# Patient Record
Sex: FEMALE | Race: AMERICAN INDIAN OR ALASKA NATIVE | URBAN - METROPOLITAN AREA
[De-identification: names, ages, dates, MRNs, and addresses within clinical notes are randomized per-mention and may not be internally consistent; named-entity substitution may affect disease eponyms.]

---

## 2018-03-04 LAB
ALT SERPL-CCNC: 14 IU/L (ref 6–31)
AST SERPL-CCNC: 14 IU/L (ref 10–40)
CREAT SERPL-MCNC: 0.9 MG/DL (ref 0.4–1)
GLUCOSE SERPL-MCNC: 97 MG/DL (ref 70–100)
POTASSIUM SERPL-SCNC: 4.1 MEQ/L (ref 3.4–5.1)

## 2018-03-05 ENCOUNTER — TRANSFERRED RECORDS (OUTPATIENT)
Dept: HEALTH INFORMATION MANAGEMENT | Facility: CLINIC | Age: 43
End: 2018-03-05

## 2018-03-05 ENCOUNTER — HOSPITAL ENCOUNTER (INPATIENT)
Facility: HOSPITAL | Age: 43
LOS: 4 days | Discharge: HOME | DRG: 881 | End: 2018-03-09
Attending: PSYCHIATRY & NEUROLOGY | Admitting: PSYCHIATRY & NEUROLOGY

## 2018-03-05 DIAGNOSIS — F13.10 BENZODIAZEPINE ABUSE (H): ICD-10-CM

## 2018-03-05 DIAGNOSIS — T42.6X2S: Primary | ICD-10-CM

## 2018-03-05 PROBLEM — T14.91XA SUICIDE ATTEMPT (H): Status: ACTIVE | Noted: 2018-03-05

## 2018-03-05 PROCEDURE — 12400000 ZZH R&B MH

## 2018-03-05 RX ORDER — ALUMINA, MAGNESIA, AND SIMETHICONE 2400; 2400; 240 MG/30ML; MG/30ML; MG/30ML
30 SUSPENSION ORAL EVERY 4 HOURS PRN
Status: DISCONTINUED | OUTPATIENT
Start: 2018-03-05 | End: 2018-03-09 | Stop reason: HOSPADM

## 2018-03-05 RX ORDER — OLANZAPINE 10 MG/2ML
10 INJECTION, POWDER, FOR SOLUTION INTRAMUSCULAR 3 TIMES DAILY PRN
Status: DISCONTINUED | OUTPATIENT
Start: 2018-03-05 | End: 2018-03-09 | Stop reason: HOSPADM

## 2018-03-05 RX ORDER — ACETAMINOPHEN 325 MG/1
650 TABLET ORAL EVERY 4 HOURS PRN
Status: DISCONTINUED | OUTPATIENT
Start: 2018-03-05 | End: 2018-03-09 | Stop reason: HOSPADM

## 2018-03-05 RX ORDER — HYDROXYZINE HYDROCHLORIDE 25 MG/1
25-50 TABLET, FILM COATED ORAL EVERY 4 HOURS PRN
Status: DISCONTINUED | OUTPATIENT
Start: 2018-03-05 | End: 2018-03-09 | Stop reason: HOSPADM

## 2018-03-05 RX ORDER — HYDROXYCHLOROQUINE SULFATE 200 MG/1
200 TABLET, FILM COATED ORAL 2 TIMES DAILY
COMMUNITY

## 2018-03-05 RX ORDER — OXYCODONE AND ACETAMINOPHEN 5; 325 MG/1; MG/1
1-2 TABLET ORAL DAILY PRN
Status: ON HOLD | COMMUNITY
End: 2018-03-08

## 2018-03-05 RX ORDER — OLANZAPINE 10 MG/1
10 TABLET ORAL 3 TIMES DAILY PRN
Status: DISCONTINUED | OUTPATIENT
Start: 2018-03-05 | End: 2018-03-09 | Stop reason: HOSPADM

## 2018-03-05 NOTE — IP AVS SNAPSHOT
[facility]    [address] [place] [address]    Phone:  [phone]    Fax:  [phone]                                       After Visit Summary   3/5/2018    Claudia Beltran    MRN: [id number]           After Visit Summary Signature Page     I have received my discharge instructions, and my questions have been answered. I have discussed any challenges I see with this plan with the nurse or doctor.    ..........................................................................................................................................  Patient/Patient Representative Signature      ..........................................................................................................................................  Patient Representative Print Name and Relationship to Patient    ..................................................               ................................................  Date                                            Time    ..........................................................................................................................................  Reviewed by Signature/Title    ...................................................              ..............................................  Date                                                            Time

## 2018-03-05 NOTE — IP AVS SNAPSHOT
MRN:[id number]                      After Visit Summary   3/5/2018    Claudia Beltran    MRN: [id number]           Thank you!     Thank you for choosing [facility] for your care. Our goal is always to provide you with excellent care. Hearing back from our patients is one way we can continue to improve our services. Please take a few minutes to complete the written survey that you may receive in the mail after you visit with us. Thank you!        Patient Information     Date Of Birth          1975        Designated Caregiver       Most Recent Value    Caregiver    Will someone help with your care after discharge? no    Name of designated caregiver lives with mother      About your hospital stay     You were admitted on:  March 5, 2018 You last received care in the:  [facility]    You were discharged on:  March 9, 2018       Who to Call     For medical emergencies, please call 911.  For non-urgent questions about your medical care, please call your primary care provider or clinic, None          Attending Provider     Provider Specialty    Pedro Butts MD Psychiatry    Allison Hearn NP Nurse Practitioner       Primary Care Provider Fax #    Physician No Ref-Primary [phone]      Further instructions from your care team       Behavioral Discharge Planning and Instructions    Summary: Patient was admitted after an intentional overdoes.     Main Diagnosis:  Unspecified depressive disorder, Rule out major depressive disorder, severe without psychotic features, Opiate use disorder, severe    Major Treatments, Procedures and Findings: Stabilize with medications, connect with community programs.    Symptoms to Report: feeling more aggressive, increased confusion, losing more sleep, mood getting worse or thoughts of suicide    Lifestyle Adjustment: Take all medications as prescribed, meet with doctor/ medication provider, out patient therapist, , and [facility] worker as  scheduled. Abstain from alcohol or any unprescribed drugs.    Psychiatry Follow-up:     [facility]   PCP- Dr. Loreta Cruz- March 27th @ 1:00pm   [address]  [address]  [place] [address]  Phone: [phone] Fax: [phone]     [facility]   Rule 25 assessment- Дмитрий Wood- March 14th @ 10am, Please ask for referral for mental health services at this apt   Phone:[phone] Fax: [phone]  [facility] Counselor- Anne Manzo- [phone]  Mental Health Supervisor- Bobbi Dale [phone]- Call for mental health services: Medication Management, Therapy, Case Management    Family Violence Prevention   Phone: [phone]  Crisis Line- 9-806-619-4004     Meeting: Mondays, 7 p.m., [facility]    Mobile Crisis Team/ Crisis Hotline: Call 1-718.571.1532 to reach [facility] Crisis Response.    Resources:   Crisis Intervention: 470.242.1002 or 236-854-8059 (TTY: 141.834.7078).  Call anytime for help.  National [place] on Mental Illness (www.[place].amie.org): 276.505.9751 or 252-677-5547.  Suicide Awareness Voices of Education (SAVE) (www.save.org): 272-382-HXBE (9878)  National Suicide Prevention Line (www.mentalhealthmn.org): 464-774-TNNZ (1643)  Mental Health Consumer/Survivor Network of [place] (www.mhcsn.net): 289.909.3004 or 625-762-1282  Mental Health Association of [place] (www.mentalhealth.org): 974.613.3353 or 095-863-5429    General Medication Instructions:   See your medication sheet(s) for instructions.   Take all medicines as directed.  Make no changes unless your doctor suggests them.   Go to all your doctor visits.  Be sure to have all your required lab tests. This way, your medicines can be refilled on time.  Do not use any drugs not prescribed by your doctor.  Avoid alcohol.    [place] Area:  [facility], Crisis stabilization [facility] [phone]  [facility] Crisis Line: 1-427.536.5113  [facility]ates For Family Peace: 578-9327  Sexual Assault Program [facility]  "[facility]: 490.439.7772 or 1-703.296.2631  [place] [facility] Women[facility]: 1-706.495.6108 Ext: 279       Calls answered Mon-Fri-8:00 am--4:30 pm    [place]:  [facility] for Family Peace: 1-221.624.3125  [facility] first call for help: 5-407-013-7728  [facility]:  (370) 350-4851      [place] Area:  Warm Line: 1-676.408.4957       Calls answered Tuesday--Saturday 4:00 pm--10:00 pm  [facility] Crisis Line - 740.563.9559  [facility] 418-508-5409    [place] Statewide:  [place] Crisis and Referral Services: 1-590.403.3468  National Suicide Prevention Lifeline: 8-908-572-TALK (9040)   - ond6thjs- Text  Life  to [address]5312  First Call for Help:   [facility] Helpline- 6-739-AGCS-HELP      Pending Results     No orders found from 3/3/2018 to 3/6/2018.            Statement of Approval     Ordered          18 1437  I have reviewed and agree with all the recommendations and orders detailed in this document.  EFFECTIVE NOW     Approved and electronically signed by:  Rani Garcia Mc, DAVID CNP             Admission Information     Date & Time Department Dept. Phone    3/5/2018 [facility] [phone]      Your Vitals Were     Blood Pressure Pulse Temperature Respirations Weight Pulse Oximetry    96/62 61 96.2  F (35.7  C) (Tympanic) 14 134 kg (295 lb 5 oz) 100%      Weekend-a-gogohart Information     WebVisible lets you send messages to your doctor, view your test results, renew your prescriptions, schedule appointments and more. To sign up, go to [email or web address] . Click on \"Log in\" on the left side of the screen, which will take you to the Welcome page. Then click on \"Sign up Now\" on the right side of the page.     You will be asked to enter the access code listed below, as well as some personal information. Please follow the directions to create your username and password.     Your access code is: [email or web address]  Expires: 2018 12:10 AM     Your access code will  in 90 " days. If you need help or a new code, please call your [facility] clinic or [phone].        Care EveryWhere ID     This is your Care EveryWhere ID. This could be used by other organizations to access your [facility] medical records  [id number]        Equal Access to Services     BARBIESHANTHI PAPO : Hadii aad ku hadckdontrell Sokash, waaxda luqadaha, qaybta kaalmada sosanellyzane, pao ruizgracekary guerrero. So [facility] [phone].    ATENCIÓN: Si habla español, tiene a mario disposición servicios gratuitos de asistencia lingüística. Llame al [phone].    We comply with applicable federal civil rights laws and [place] laws. We do not discriminate on the basis of race, color, national origin, age, disability, sex, sexual orientation, or gender identity.               Review of your medicines      CONTINUE these medicines which may have CHANGED, or have new prescriptions. If we are uncertain of the size of tablets/capsules you have at home, strength may be listed as something that might have changed.        Dose / Directions    DULoxetine 30 MG EC capsule   Commonly known as:  CYMBALTA   This may have changed:    - medication strength  - how much to take   Used for:  Gabapentin overdose, intentional self-harm, sequela (H), Benzodiazepine abuse        Dose:  90 mg   Take 3 capsules (90 mg) by mouth daily   Quantity:  90 capsule   Refills:  0         CONTINUE these medicines which have NOT CHANGED        Dose / Directions    GABAPENTIN PO        Dose:  300 mg   Take 300 mg by mouth 3 times daily   Refills:  0       hydroxychloroquine 200 MG tablet   Commonly known as:  PLAQUENIL        Dose:  200 mg   Take 200 mg by mouth 2 times daily   Refills:  0       TOPIRAMATE PO   Indication:  not filled since 9/2017        Dose:  50 mg   Take 50 mg by mouth 2 times daily Not filled at pharmacy   Refills:  0       TRAZODONE HCL PO        Dose:  50 mg   Take 50 mg by mouth At Bedtime   Refills:  0         STOP taking      oxyCODONE-acetaminophen 5-325 MG per tablet   Commonly known as:  PERCOCET                Where to get your medicines      These medications were sent to [facility] - [place], [place] - [address]  [address] [place] [address]     Phone:  [phone]     DULoxetine 30 MG EC capsule                Protect others around you: Learn how to safely use, store and throw away your medicines at www.disposemymeds.org.        ANTIBIOTIC INSTRUCTION     You've Been Prescribed an Antibiotic - Now What?  Your healthcare team thinks that you or your loved one might have an infection. Some infections can be treated with antibiotics, which are powerful, life-saving drugs. Like all medications, antibiotics have side effects and should only be used when necessary. There are some important things you should know about your antibiotic treatment.      Your healthcare team may run tests before you start taking an antibiotic.    Your team may take samples (e.g., from your blood, urine or other areas) to run tests to look for bacteria. These test can be important to determine if you need an antibiotic at all and, if you do, which antibiotic will work best.      Within a few days, your healthcare team might change or even stop your antibiotic.    Your team may start you on an antibiotic while they are working to find out what is making you sick.    Your team might change your antibiotic because test results show that a different antibiotic would be better to treat your infection.    In some cases, once your team has more information, they learn that you do not need an antibiotic at all. They may find out that you don't have an infection, or that the antibiotic you're taking won't work against your infection. For example, an infection caused by a virus can't be treated with antibiotics. Staying on an antibiotic when you don't need it is more likely to be harmful than helpful.      You may experience side effects from  your antibiotic.    Like all medications, antibiotics have side effects. Some of these can be serious.    Let you healthcare team know if you have any known allergies when you are admitted to the hospital.    One significant side effect of nearly all antibiotics is the risk of severe and sometimes deadly diarrhea caused by Clostridium difficile (C. Difficile). This occurs when a person takes antibiotics because some good germs are destroyed. Antibiotic use allows C. diificile to take over, putting patients at high risk for this serious infection.    As a patient or caregiver, it is important to understand your or your loved one's antibiotic treatment. It is especially important for caregivers to speak up when patients can't speak for themselves. Here are some important questions to ask your healthcare team.    What infection is this antibiotic treating and how do you know I have that infection?    What side effects might occur from this antibiotic?    How long will I need to take this antibiotic?    Is it safe to take this antibiotic with other medications or supplements (e.g., vitamins) that I am taking?     Are there any special directions I need to know about taking this antibiotic? For example, should I take it with food?    How will I be monitored to know whether my infection is responding to the antibiotic?    What tests may help to make sure the right antibiotic is prescribed for me?      Information provided by:  www.cdc.gov/getsmart  U.S. Department of Health and Human Services  Centers for disease Control and Prevention  National Center for Emerging and Zoonotic Infectious Diseases  Division of Healthcare Quality Promotion             Medication List: This is a list of all your medications and when to take them. Check marks below indicate your daily home schedule. Keep this list as a reference.      Medications           Morning Afternoon Evening Bedtime As Needed    DULoxetine 30 MG EC capsule   Commonly  known as:  CYMBALTA   Take 3 capsules (90 mg) by mouth daily   Last time this was given:  90 mg on 3/9/2018  6:51 AM                                GABAPENTIN PO   Take 300 mg by mouth 3 times daily   Last time this was given:  300 mg on 3/9/2018  6:51 AM                                hydroxychloroquine 200 MG tablet   Commonly known as:  PLAQUENIL   Take 200 mg by mouth 2 times daily                                TOPIRAMATE PO   Take 50 mg by mouth 2 times daily Not filled at pharmacy   Last time this was given:  50 mg on 3/9/2018  6:51 AM                                TRAZODONE HCL PO   Take 50 mg by mouth At Bedtime

## 2018-03-06 PROCEDURE — 99223 1ST HOSP IP/OBS HIGH 75: CPT | Performed by: NURSE PRACTITIONER

## 2018-03-06 PROCEDURE — 25000132 ZZH RX MED GY IP 250 OP 250 PS 637: Performed by: NURSE PRACTITIONER

## 2018-03-06 PROCEDURE — 12400000 ZZH R&B MH

## 2018-03-06 RX ORDER — IBUPROFEN 800 MG/1
800 TABLET, FILM COATED ORAL 3 TIMES DAILY PRN
Status: DISCONTINUED | OUTPATIENT
Start: 2018-03-06 | End: 2018-03-09 | Stop reason: HOSPADM

## 2018-03-06 RX ORDER — NALOXONE HYDROCHLORIDE 0.4 MG/ML
.1-.4 INJECTION, SOLUTION INTRAMUSCULAR; INTRAVENOUS; SUBCUTANEOUS
Status: DISCONTINUED | OUTPATIENT
Start: 2018-03-06 | End: 2018-03-09 | Stop reason: HOSPADM

## 2018-03-06 RX ORDER — TOPIRAMATE 50 MG/1
50 TABLET, FILM COATED ORAL 2 TIMES DAILY
Status: DISCONTINUED | OUTPATIENT
Start: 2018-03-06 | End: 2018-03-09 | Stop reason: HOSPADM

## 2018-03-06 RX ORDER — BUPRENORPHINE 2 MG/1
2 TABLET SUBLINGUAL 4 TIMES DAILY
Status: DISCONTINUED | OUTPATIENT
Start: 2018-03-06 | End: 2018-03-06 | Stop reason: RX

## 2018-03-06 RX ORDER — BUPRENORPHINE HYDROCHLORIDE AND NALOXONE HYDROCHLORIDE DIHYDRATE 2; .5 MG/1; MG/1
1 TABLET SUBLINGUAL 2 TIMES DAILY
Status: DISCONTINUED | OUTPATIENT
Start: 2018-03-06 | End: 2018-03-06 | Stop reason: RX

## 2018-03-06 RX ORDER — BUPRENORPHINE AND NALOXONE 2; .5 MG/1; MG/1
1 FILM, SOLUBLE BUCCAL; SUBLINGUAL DAILY
Status: DISCONTINUED | OUTPATIENT
Start: 2018-03-06 | End: 2018-03-07

## 2018-03-06 RX ORDER — SUMATRIPTAN 50 MG/1
50 TABLET, FILM COATED ORAL DAILY PRN
Status: DISCONTINUED | OUTPATIENT
Start: 2018-03-06 | End: 2018-03-09 | Stop reason: HOSPADM

## 2018-03-06 RX ORDER — ONDANSETRON 4 MG/1
4 TABLET, FILM COATED ORAL EVERY 6 HOURS PRN
Status: DISCONTINUED | OUTPATIENT
Start: 2018-03-06 | End: 2018-03-09 | Stop reason: HOSPADM

## 2018-03-06 RX ORDER — GABAPENTIN 300 MG/1
300 CAPSULE ORAL 3 TIMES DAILY
Status: DISCONTINUED | OUTPATIENT
Start: 2018-03-06 | End: 2018-03-09 | Stop reason: HOSPADM

## 2018-03-06 RX ORDER — CLONIDINE HYDROCHLORIDE 0.1 MG/1
0.1 TABLET ORAL 3 TIMES DAILY PRN
Status: DISCONTINUED | OUTPATIENT
Start: 2018-03-06 | End: 2018-03-09 | Stop reason: HOSPADM

## 2018-03-06 RX ADMIN — NICOTINE POLACRILEX 4 MG: 2 GUM, CHEWING ORAL at 20:37

## 2018-03-06 RX ADMIN — HYDROXYZINE HYDROCHLORIDE 50 MG: 25 TABLET ORAL at 21:06

## 2018-03-06 RX ADMIN — IBUPROFEN 800 MG: 800 TABLET ORAL at 18:47

## 2018-03-06 RX ADMIN — TOPIRAMATE 50 MG: 50 TABLET, FILM COATED ORAL at 20:37

## 2018-03-06 RX ADMIN — HYDROXYZINE HYDROCHLORIDE 50 MG: 25 TABLET ORAL at 01:48

## 2018-03-06 RX ADMIN — GABAPENTIN 300 MG: 300 CAPSULE ORAL at 20:37

## 2018-03-06 RX ADMIN — CLONIDINE HYDROCHLORIDE 0.1 MG: 0.1 TABLET ORAL at 18:47

## 2018-03-06 RX ADMIN — ACETAMINOPHEN 650 MG: 325 TABLET, FILM COATED ORAL at 00:13

## 2018-03-06 RX ADMIN — DULOXETINE HYDROCHLORIDE 90 MG: 60 CAPSULE, DELAYED RELEASE ORAL at 13:27

## 2018-03-06 RX ADMIN — SUMATRIPTAN 50 MG: 50 TABLET, FILM COATED ORAL at 13:29

## 2018-03-06 RX ADMIN — BUPRENORPHINE HYDROCHLORIDE, NALOXONE HYDROCHLORIDE 1 FILM: 2; .5 FILM, SOLUBLE BUCCAL; SUBLINGUAL at 14:00

## 2018-03-06 RX ADMIN — GABAPENTIN 300 MG: 300 CAPSULE ORAL at 13:27

## 2018-03-06 ASSESSMENT — ACTIVITIES OF DAILY LIVING (ADL)
COGNITION: 0 - NO COGNITION ISSUES REPORTED
DRESS: INDEPENDENT;SCRUBS (BEHAVIORAL HEALTH)
DRESS: 0-->INDEPENDENT
GROOMING: INDEPENDENT
SWALLOWING: 0-->SWALLOWS FOODS/LIQUIDS WITHOUT DIFFICULTY
GROOMING: INDEPENDENT
DRESS: INDEPENDENT
FALL_HISTORY_WITHIN_LAST_SIX_MONTHS: NO
TRANSFERRING: 0-->INDEPENDENT
RETIRED_COMMUNICATION: 0-->UNDERSTANDS/COMMUNICATES WITHOUT DIFFICULTY
RETIRED_EATING: 0-->INDEPENDENT
AMBULATION: 0-->INDEPENDENT
BATHING: 0-->INDEPENDENT
TOILETING: 0-->INDEPENDENT
ORAL_HYGIENE: INDEPENDENT

## 2018-03-06 NOTE — PROGRESS NOTES
03/05/18 2204   Patient Belongings   Did you bring any home meds/supplements to the hospital?  No   Patient Belongings other (see comments)  (White shoes, Shoe laces, Leather / fur jacket, blue lighter, gan, Black Bra, White underwear, white socks, pink sewat pants, black T shirt)   Disposition of Belongings Other (see comment)  (PT cubby in belongings room)   Belongings Search Yes   Clothing Search Yes   Second Staff Milana HOLLEY    List items sent to safe: Black My Hood smart phone with black case, NO CASH, NO WALLET  All other belongings put in assigned cubby in belongings room.     I have reviewed my belongings list on admission and verify that it is correct.     Patient signature_______________________________    Second staff witness (if patient unable to sign) ______________________________       I have received all my belongings at discharge.    Patient signature________________________________    Tyrel   3/5/2018  10:06 PM
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
## 2018-03-06 NOTE — PLAN OF CARE
Face to face end of shift report received from Kailey HALL RN. Rounding completed. Patient observed.     Maricel Rico  3/6/2018  8:11 AM
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
## 2018-03-06 NOTE — PLAN OF CARE
Problem: Patient Care Overview  Goal: Team Discussion  Team Plan:   BEHAVIORAL TEAM DISCUSSION    Participants: Mariposa Galarza NP, Anna Asif NP, Allison Hearn NP, Keya Nieves [facility], Shannon Latif LSW, Dawna Horton Discharge Planner, Jocelynn Jules RN,  Charissa Thomas RN, Angelique Tinoco RN, [facility], Anna Figueroa OT  Progress: new admit  Continued Stay Criteria/Rationale: new admit, NP to assess  Medical/Physical: withdrawal  Precautions:   Behavioral Orders   Procedures     Code 1 - Restrict to Unit     Routine Programming     As clinically indicated     Self Injury Precaution     Bathroom door to remain locked until after provider evaluation     Status 15     Every 15 minutes.     Plan: NP to assess and determine  Rationale for change in precautions or plan: None
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
## 2018-03-06 NOTE — PLAN OF CARE
Face to face end of shift report received from TERA Blanco. Rounding completed. Patient observed resting in bed.     TASHA SIERRA  3/6/2018  1:26 AM

## 2018-03-06 NOTE — PLAN OF CARE
"Problem: Patient Care Overview  Goal: Individualization & Mutuality  Patient will have no self harm while on unit  Patient will be compliant with tx team recommendations  Patient will report decrease in depression  Patient will no have SI by discharge    At beginning of shift patient c/o severe pain to sciatic pain and \"starting to get a headache\". Pt upset that she doesn't have gabapentin or other stronger pain medications ordered at this time with exception of Tylenol. Despite being upset patient accepted Tylenol 650 mg PRN at 0013.   At 0140 pt walked to nurse station stating that she throw up, is sweating, anxious and shaking. Pt states she needs something because is going in withdrawal. Pt bathroom door is lock at this time and no emesis noted in room. Not visible sweat noted. Tongue with no noted tremors. Hands slightly tremors visibly. VSS and within parameters. Pt afebrile. Atarax 50 mg given at 0148. Will continue to monitor.   0300- Pt appears to be sleeping with eyes closed, having regular respirations and position changes. Will continue to monitor.  0500- Pt has had approximately only 2 hours of intermittent sleep. Pt restless but with no further complains. Will continue to monitor.           "

## 2018-03-06 NOTE — PLAN OF CARE
"Problem: Patient Care Overview  Goal: Individualization & Mutuality  Patient will have a safe withdrawal from opiates.  Patient will be compliant with tx team recommendations  Patient will report decrease in depression  Patient will no have SI by discharge    Patient will attend at least 50% of unit programming.  Patient will sleep at least 5 hours per night.  Outcome: Therapy, progress toward functional goals is gradual  Patient in bed at the start of this shift.  Patient up for breakfast and mood was agitated.  Patient complained that she was unable to sleep last night and that she was   Crawling out of her skin\".  Patient had hot breakfast and given scrub jacket for complaints of the chills.  Patient went to bed immediately after eating and fell asleep until lunchtime so was allowed to sleep due to her complaints of poor sleep.  Patient ate lunch in the lounge and went back to her room.  Patient was heard swearing loudly in her room.  This nurse and NP in to see patient.  Patient had rapid, pressured speech and was able to redirect and cooperate with assessment.  Patient admits to opiate abuse over several years.  Patient apologetic for her behavior here and remorseful for the behavior she exhibited prior to admission.  Patient denies any past suicide attempts and denies any current ideation or intent.  Patient given scheduled medications along with Imitrex 50 mg po for headache.  Patient scored 13 on opiate withdrawal scale.  Started Suboxone.  Patient able to shower and states she is feeling better.  Patient was given her bra and underwear.  Patient's mood is calmer and cooperative.    Problem: Suicidal Behavior (Adult)  Goal: Suicidal Behavior is Absent/Minimized/Managed  Patient will be free of self injury.  Outcome: Improving  No self injuries; patient denies current suicidal ideation or intent.      "

## 2018-03-06 NOTE — PLAN OF CARE
ADMISSION NOTE    Reason for admission - Intentional overdose.  Safety concerns - Stated depression, regret  Risk for or history of violence - None.   Full skin assessment: Completed    Patient arrived on unit from [facility] accompanied by Police officers, Hospital security on 3/5/2018  08:50 PM.   Status on arrival: Ambulating on her own, alert and oriented x4  /90  Pulse 100  Temp 96.8  F (36  C) (Tympanic)  Resp 14  Wt 134 kg (295 lb 5 oz)  SpO2 97%  Patient given tour of unit and Welcome to  unit papers given to patient, wanding completed, belongings inventoried, and admission assessment completed.   Patients legal status on arrival is voluntary. Appropriate legal rights discussed with and copy given to patient. Patient Bill of Rights discussed with and copy given to patient.   Patient denies SI, HI, and thoughts of self harm and contracts for safety while on unit.      Bella Ackerman  3/5/2018  08:50 PM

## 2018-03-07 PROBLEM — T42.6X2S: Status: ACTIVE | Noted: 2018-03-05

## 2018-03-07 PROBLEM — F13.10 BENZODIAZEPINE ABUSE (H): Status: ACTIVE | Noted: 2018-03-07

## 2018-03-07 PROBLEM — F11.10 OPIOID ABUSE (H): Status: ACTIVE | Noted: 2018-03-07

## 2018-03-07 LAB — TSH SERPL DL<=0.005 MIU/L-ACNC: 1.56 MU/L (ref 0.4–4)

## 2018-03-07 PROCEDURE — 82306 VITAMIN D 25 HYDROXY: CPT | Performed by: NURSE PRACTITIONER

## 2018-03-07 PROCEDURE — 25000132 ZZH RX MED GY IP 250 OP 250 PS 637: Performed by: NURSE PRACTITIONER

## 2018-03-07 PROCEDURE — 36415 COLL VENOUS BLD VENIPUNCTURE: CPT | Performed by: NURSE PRACTITIONER

## 2018-03-07 PROCEDURE — 99232 SBSQ HOSP IP/OBS MODERATE 35: CPT | Performed by: NURSE PRACTITIONER

## 2018-03-07 PROCEDURE — 12400000 ZZH R&B MH

## 2018-03-07 PROCEDURE — 84443 ASSAY THYROID STIM HORMONE: CPT | Performed by: NURSE PRACTITIONER

## 2018-03-07 RX ADMIN — IBUPROFEN 800 MG: 800 TABLET ORAL at 17:17

## 2018-03-07 RX ADMIN — GABAPENTIN 300 MG: 300 CAPSULE ORAL at 20:16

## 2018-03-07 RX ADMIN — TOPIRAMATE 50 MG: 50 TABLET, FILM COATED ORAL at 08:40

## 2018-03-07 RX ADMIN — TOPIRAMATE 50 MG: 50 TABLET, FILM COATED ORAL at 20:16

## 2018-03-07 RX ADMIN — GABAPENTIN 300 MG: 300 CAPSULE ORAL at 13:05

## 2018-03-07 RX ADMIN — SUMATRIPTAN 50 MG: 50 TABLET, FILM COATED ORAL at 08:46

## 2018-03-07 RX ADMIN — OLANZAPINE 10 MG: 10 TABLET, FILM COATED ORAL at 17:22

## 2018-03-07 RX ADMIN — BUPRENORPHINE HYDROCHLORIDE, NALOXONE HYDROCHLORIDE 1 FILM: 2; .5 FILM, SOLUBLE BUCCAL; SUBLINGUAL at 08:41

## 2018-03-07 RX ADMIN — GABAPENTIN 300 MG: 300 CAPSULE ORAL at 08:40

## 2018-03-07 RX ADMIN — DULOXETINE HYDROCHLORIDE 90 MG: 60 CAPSULE, DELAYED RELEASE ORAL at 08:40

## 2018-03-07 ASSESSMENT — ACTIVITIES OF DAILY LIVING (ADL)
ORAL_HYGIENE: INDEPENDENT
ORAL_HYGIENE: INDEPENDENT
DRESS: SCRUBS (BEHAVIORAL HEALTH)
GROOMING: INDEPENDENT
LAUNDRY: UNABLE TO COMPLETE
DRESS: INDEPENDENT
GROOMING: INDEPENDENT

## 2018-03-07 NOTE — PLAN OF CARE
Problem: Patient Care Overview  Goal: Individualization & Mutuality  Patient will have a safe withdrawal from opiates.  Patient will be compliant with tx team recommendations  Patient will report decrease in depression  Patient will no have SI by discharge    Patient will attend at least 50% of unit programming.  Patient will sleep at least 5 hours per night.   Outcome: Therapy, progress toward functional goals is gradual  Pt. Having safe withdrawal from opiates, compliant with treatment team recommendations, reports a decrease in depression, denies suicidal ideation, encouraged to attend group therapy, slept 6 hours last night, will continue to monitor progress.    Problem: Suicidal Behavior (Adult)  Goal: Suicidal Behavior is Absent/Minimized/Managed  Patient will be free of self injury.   Outcome: Improving  PT. Has been free from self injury.

## 2018-03-07 NOTE — PLAN OF CARE
Face to face end of shift report received from TERA Connelly. Rounding completed. Patient observed.     Katy De Los Santos  3/6/2018  10:22 PM

## 2018-03-07 NOTE — PLAN OF CARE
Face to face end of shift report received from Sarah MANJARREZ RN. Rounding completed. Patient observed in group room.     Luzma Pate  3/7/2018  3:52 PM

## 2018-03-07 NOTE — PLAN OF CARE
Problem: Patient Care Overview  Goal: Team Discussion  Team Plan:   BEHAVIORAL TEAM DISCUSSION    Participants: Carolyn Ken NP, Osorio Gregorio NP, Rani Garcia NP, Keya Nieves LICSW, Shannon Latif LSW, Dawna Horton Discharge Planner, Jocelynn Jules RN,  Angelique Tinoco RN, Anna Figueroa OT  Progress: Up on the unit and attending groups, frustrated and irritable at times, anxiety high, depression moderate, tearful  Continued Stay Criteria/Rationale: Going through withdrawal, overdosed prior to admit, does not feel safe to go back to mom's, needs more stabilization  Medical/Physical: withdrawal  Precautions:   Behavioral Orders   Procedures     Code 1 - Restrict to Unit     Routine Programming     As clinically indicated     Self Injury Precaution     Bathroom door to remain locked until after provider evaluation     Status 15     Every 15 minutes.     Plan: Get set up with insurance, agreeable to Rule 25 assessment, plans to return home with mom and get rule 25 and treatment from home.  Rationale for change in precautions or plan: None
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
## 2018-03-07 NOTE — PLAN OF CARE
Problem: Patient Care Overview  Goal: Individualization & Mutuality  Patient will have a safe withdrawal from opiates.  Patient will be compliant with tx team recommendations  Patient will report decrease in depression  Patient will no have SI by discharge    Patient will attend at least 50% of unit programming.  Patient will sleep at least 5 hours per night.   0355: Pt observed in bed and appears to be asleep. Eyes closed and unlabored respirations noted. Will continue to monitor and document any changes.   0530: Pt appeared to be asleep throughout NOC shift. 15 minute safety and PRN checks done throughout shift with position changes noted.   0715: Pt scored a 7 on COWS scale. Pt reports having chills, restlessness, aches, nausea, and loose stools.

## 2018-03-07 NOTE — PLAN OF CARE
Face to face end of shift report received from Sara SANDS RN. Rounding completed. Patient observed.     Sarah Tenorio  3/7/2018  8:10 AM

## 2018-03-07 NOTE — PLAN OF CARE
Face to face end of shift report received from Katy De Los Santos RN. Rounding completed. Patient observed.     Sara Dean  3/7/2018  12:51 AM

## 2018-03-07 NOTE — DISCHARGE INSTRUCTIONS
Behavioral Discharge Planning and Instructions    Summary: Patient was admitted after an intentional overdoes.     Main Diagnosis:  Unspecified depressive disorder, Rule out major depressive disorder, severe without psychotic features, Opiate use disorder, severe    Major Treatments, Procedures and Findings: Stabilize with medications, connect with community programs.    Symptoms to Report: feeling more aggressive, increased confusion, losing more sleep, mood getting worse or thoughts of suicide    Lifestyle Adjustment: Take all medications as prescribed, meet with doctor/ medication provider, out patient therapist, , and [facility] worker as scheduled. Abstain from alcohol or any unprescribed drugs.    Psychiatry Follow-up:     [facility]   PCP- Dr. Loreta Cruz- March 27th @ 1:00pm   [address]  [address]  [place] [address]  Phone: [phone] Fax: [phone]     [facility]   Rule 25 assessment- Дмитрий Wood- March 14th @ 10am, Please ask for referral for mental health services at this apt   Phone:[phone] Fax: [phone]  [facility] Counselor- Anne Manzo [phone]  Mental Health Supervisor- Bobbi Dale [phone]- Call for mental health services: Medication Management, Therapy, Case Management    Family Violence Prevention   Phone: [phone]  Crisis Line- 1-498.463.1817     Meeting: Mondays, 7 p.m., [facility]    Mobile Crisis Team/ Crisis Hotline: Call 1-962.775.4431 to reach [facility] Crisis Response.    Resources:   Crisis Intervention: 745.994.8239 or 645-198-9351 (TTY: 918.138.9045).  Call anytime for help.  National [place] on Mental Illness (www.[place].org): 916.392.2374 or 174-310-6605.  Suicide Awareness Voices of Education (SAVE) (www.save.org): 070-228-GTAA (6500)  National Suicide Prevention Line (www.mentalhealthmn.org): 881-023-RXWQ (9280)  Mental Health Consumer/Survivor Network of [place] (www.mhcsn.net):  240-999-7150 or 379-675-5174  Mental Health Association of [place] (www.mentalhealth.org): 295.555.6697 or 377-020-8523    General Medication Instructions:   See your medication sheet(s) for instructions.   Take all medicines as directed.  Make no changes unless your doctor suggests them.   Go to all your doctor visits.  Be sure to have all your required lab tests. This way, your medicines can be refilled on time.  Do not use any drugs not prescribed by your doctor.  Avoid alcohol.    [place] Area:  [facility], Crisis stabilization [facility]- [phone]  [facility] Crisis Line: 1-368.824.7378  [facility]ates For Family Peace: 888-4547  Sexual Assault Program [facility]: 686.286.2589 or 1-513.991.6711  [place] Battered Women's Program: 6-794-221-1806 Ext: 279       Calls answered Mon-Fri-8:00 am--4:30 pm    [place]:  [facility] for Family Peace: 3-824-438-3287  [facility] first call for help: 0-754-717-0701  [facility] Crisis Center:  (592) 422-2210      [place] Area:  Warm Line: 1-561.672.3279       Calls answered Tuesday--Saturday 4:00 pm--10:00 pm  [facility] Crisis Line - [phone]  [place] Crisis Stabilization 572-868-6990    [place] Statewide:  [place] Crisis and Referral Services: 1-961.197.7727  National Suicide Prevention Lifeline: 7-087-207-TALK (8157)   - mro0cnks- Text  Life  to 95594  First Call for Help: 2-1-1  [facility] Helpline- 0-263-YCCA-HELP
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
## 2018-03-08 LAB — DEPRECATED CALCIDIOL+CALCIFEROL SERPL-MC: 11 UG/L (ref 20–75)

## 2018-03-08 PROCEDURE — 25000132 ZZH RX MED GY IP 250 OP 250 PS 637: Performed by: NURSE PRACTITIONER

## 2018-03-08 PROCEDURE — 12400000 ZZH R&B MH

## 2018-03-08 PROCEDURE — 99239 HOSP IP/OBS DSCHRG MGMT >30: CPT | Performed by: NURSE PRACTITIONER

## 2018-03-08 RX ORDER — TRAZODONE HYDROCHLORIDE 50 MG/1
50 TABLET, FILM COATED ORAL
Status: DISCONTINUED | OUTPATIENT
Start: 2018-03-08 | End: 2018-03-09 | Stop reason: HOSPADM

## 2018-03-08 RX ORDER — DULOXETIN HYDROCHLORIDE 30 MG/1
90 CAPSULE, DELAYED RELEASE ORAL DAILY
Qty: 90 CAPSULE | Refills: 0 | Status: SHIPPED | OUTPATIENT
Start: 2018-03-09

## 2018-03-08 RX ADMIN — TOPIRAMATE 50 MG: 50 TABLET, FILM COATED ORAL at 21:17

## 2018-03-08 RX ADMIN — DULOXETINE HYDROCHLORIDE 90 MG: 60 CAPSULE, DELAYED RELEASE ORAL at 08:18

## 2018-03-08 RX ADMIN — NICOTINE POLACRILEX 4 MG: 2 GUM, CHEWING ORAL at 17:44

## 2018-03-08 RX ADMIN — GABAPENTIN 300 MG: 300 CAPSULE ORAL at 08:18

## 2018-03-08 RX ADMIN — GABAPENTIN 300 MG: 300 CAPSULE ORAL at 13:21

## 2018-03-08 RX ADMIN — OLANZAPINE 10 MG: 10 TABLET, FILM COATED ORAL at 08:21

## 2018-03-08 RX ADMIN — SUMATRIPTAN 50 MG: 50 TABLET, FILM COATED ORAL at 22:27

## 2018-03-08 RX ADMIN — HYDROXYZINE HYDROCHLORIDE 50 MG: 25 TABLET ORAL at 20:36

## 2018-03-08 RX ADMIN — TOPIRAMATE 50 MG: 50 TABLET, FILM COATED ORAL at 08:18

## 2018-03-08 RX ADMIN — IBUPROFEN 800 MG: 800 TABLET ORAL at 13:24

## 2018-03-08 RX ADMIN — CLONIDINE HYDROCHLORIDE 0.1 MG: 0.1 TABLET ORAL at 13:24

## 2018-03-08 RX ADMIN — GABAPENTIN 300 MG: 300 CAPSULE ORAL at 20:37

## 2018-03-08 ASSESSMENT — ACTIVITIES OF DAILY LIVING (ADL)
GROOMING: INDEPENDENT
GROOMING: INDEPENDENT
ORAL_HYGIENE: INDEPENDENT
ORAL_HYGIENE: INDEPENDENT
DRESS: SCRUBS (BEHAVIORAL HEALTH)
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
## 2018-03-08 NOTE — PLAN OF CARE
Problem: Patient Care Overview  Goal: Team Discussion  Team Plan:   BEHAVIORAL TEAM DISCUSSION    Participants: Carolyn Ken NP,Osorio Gregorio NP, Rani Garcia NP,  Shannon MCNEALW,  Dawna Horton Discharge Planner, Myranda Johnson [facility], Jocelynn Jules RN, Sarah Tenorio RN. Elena Roblero OT, Anna Figueroa OT.  Progress: Up on the unit and attending groups, frustrated and irritable at times, anxiety high, depression moderate, tearful  Continued Stay Criteria/Rationale: Going through withdrawal, overdosed prior to admit, does not feel safe to go back to mom's, needs more stabilization  Medical/Physical: none known.   Precautions:   Behavioral Orders   Procedures     Code 1 - Restrict to Unit     Routine Programming     As clinically indicated     Status 15     Every 15 minutes.     Plan: Get set up with insurance, agreeable to Rule 25 assessment, plans to return home with mom and get rule 25 and treatment from home. Informed her that Chhaya Cochran will cover her rule 25 and that it is scheduled for March 14th at 10am with Nina nagel [place].  Rationale for change in precautions or plan: none

## 2018-03-08 NOTE — PLAN OF CARE
"Problem: Patient Care Overview  Goal: Individualization & Mutuality  Patient will have a safe withdrawal from opiates.  Patient will be compliant with tx team recommendations  Patient will report decrease in depression  Patient will no have SI by discharge    Patient will attend at least 50% of unit programming.  Patient will sleep at least 5 hours per night.   Outcome: No Change  Patient cooperative with assessment questioning. Admits to pain 7/10 in lower back and left leg, did request and accept Ibuprofen 800 mg PO at 1717. Admits to agitation, states \"I just feel agitated today, maybe it's from the withdrawals\". Did accept Zyprexa 10 mg PO at 1722, and then took a nap. Affect is irritable, mood is agitated. Has been attending groups, and has been appropriate with staff and peers.       "

## 2018-03-08 NOTE — PLAN OF CARE
Face to face end of shift report received from Sarah HALEY. Rounding completed. Patient observed resting in bed.    Claudia Hernández  3/8/2018  4:07 PM
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
## 2018-03-08 NOTE — PLAN OF CARE
Problem: Patient Care Overview  Goal: Individualization & Mutuality  Patient will have a safe withdrawal from opiates.  Patient will be compliant with tx team recommendations  Patient will report decrease in depression  Patient will no have SI by discharge    Patient will attend at least 50% of unit programming.  Patient will sleep at least 5 hours per night.   Pt observed in bed and appeared to be asleep all of NOC shift. 15 minute safety and PRN checks done throughout the shift. Position changes noted. No verbal indications of sleep issues verbalized by patient. Will continue to monitor and document any changes.

## 2018-03-08 NOTE — PLAN OF CARE
Problem: Patient Care Overview  Goal: Individualization & Mutuality  Patient will have a safe withdrawal from opiates.  Patient will be compliant with tx team recommendations  Patient will report decrease in depression  Patient will no have SI by discharge    Patient will attend at least 50% of unit programming.  Patient will sleep at least 5 hours per night.   Outcome: Improving  Pt. Has no signs or symptoms of withdrawal from opiates, does not want to be discharged to her [facility] house upon discharge, denies suicidal ideation, has been attending some group therapy sessions, slept 7 hours last night, was up and ate breakfast in dayroom, taking prescribed medications, will continue to monitor progress.    Problem: Suicidal Behavior (Adult)  Goal: Suicidal Behavior is Absent/Minimized/Managed  Patient will be free of self injury.   Outcome: No Change  Pt. Has been free from self injury.  0821- Pt. Requested/received zyprexa 10 mg po for high anxiety.

## 2018-03-08 NOTE — PLAN OF CARE
Face to face end of shift report received from Luzma Pate RN. Rounding completed. Patient observed.     Sara Dean  3/8/2018  2:04 AM

## 2018-03-08 NOTE — PLAN OF CARE
Face to face end of shift report received from Sara SANDS RN. Rounding completed. Patient observed.     Sarah Tenorio  3/8/2018  8:35 AM

## 2018-03-09 VITALS
DIASTOLIC BLOOD PRESSURE: 62 MMHG | WEIGHT: 293 LBS | RESPIRATION RATE: 14 BRPM | SYSTOLIC BLOOD PRESSURE: 96 MMHG | TEMPERATURE: 96.2 F | OXYGEN SATURATION: 100 % | HEART RATE: 61 BPM

## 2018-03-09 PROCEDURE — 25000132 ZZH RX MED GY IP 250 OP 250 PS 637: Performed by: NURSE PRACTITIONER

## 2018-03-09 RX ADMIN — TOPIRAMATE 50 MG: 50 TABLET, FILM COATED ORAL at 06:51

## 2018-03-09 RX ADMIN — IBUPROFEN 800 MG: 800 TABLET ORAL at 06:51

## 2018-03-09 RX ADMIN — CLONIDINE HYDROCHLORIDE 0.1 MG: 0.1 TABLET ORAL at 06:51

## 2018-03-09 RX ADMIN — DULOXETINE HYDROCHLORIDE 90 MG: 60 CAPSULE, DELAYED RELEASE ORAL at 06:51

## 2018-03-09 RX ADMIN — GABAPENTIN 300 MG: 300 CAPSULE ORAL at 06:51

## 2018-03-09 NOTE — PLAN OF CARE
Face to face end of shift report received from Claudia Hernández RN. Rounding completed. Patient observed.     Sara Dean  3/9/2018  12:28 AM

## 2018-03-09 NOTE — PLAN OF CARE
"Problem: Patient Care Overview  Goal: Individualization & Mutuality  Patient will have a safe withdrawal from opiates.  Patient will be compliant with tx team recommendations  Patient will report decrease in depression  Patient will no have SI by discharge    Patient will attend at least 50% of unit programming.  Patient will sleep at least 5 hours per night.   Outcome: Improving  Patient has had a safe withdrawal. States today she just\" feels kind of yucky\". States she just feels very tired. Has no complaints of pain. Denies feeling depressed or anxious. Denies any suicidal thoughts. States she is having difficulty staying awake. Did attend groups this evening.states she is sleeping at night.Took shower this evening.  2000 Patient signed release of information for mother. States she is willing to accept phone calls and visitors.  2036 Patient requested and received Atarax 50 mg for high anxiety.  2120 States Atarax did help decrease anxiety.  2227 Patient given imitrex for complaint of headache which she states is beginning of migraine. Will continue to monitor comfort.   Problem: Suicidal Behavior (Adult)  Goal: Suicidal Behavior is Absent/Minimized/Managed  Patient will be free of self injury.   Outcome: Improving  Patient denies any suicidal thoughts. Is free of self injury.      "

## 2018-03-09 NOTE — PLAN OF CARE
Discharge Note    Patient Discharged to home on 3/9/2018 7:24 AM via [facility] accompanied by Unit Assistant and Nurse.     Patient informed of discharge instructions in AVS. patient verbalizes understanding and denies having any questions pertaining to AVS. Patient stable at time of discharge. Patient denies SI, HI, and thoughts of self harm at time of discharge. All personal belongings returned to patient. Discharge prescriptions sent to [facility] in [place], [place] via electronic communication. Psych evaluation, history and physical, AVS, and discharge summary faxed to next level of care- Home with patient.     Sara Dean  3/9/2018  7:36 AM

## 2018-03-09 NOTE — PLAN OF CARE
Problem: Patient Care Overview  Goal: Individualization & Mutuality  Patient will have a safe withdrawal from opiates.  Patient will be compliant with tx team recommendations  Patient will report decrease in depression  Patient will no have SI by discharge    Patient will attend at least 50% of unit programming.  Patient will sleep at least 5 hours per night.   0315: Pt observed in bed and appears to be asleep. Eyes closed and unlabored respirations noted. Pt in supine position.   0650: Pt appeared to be asleep all of NOC. 15 minute safety and PRN checks done throughout shift. AVS reviewed with patient. Medications given early as pt has early discharge. Pt received prn clonidine for anxiety.